# Patient Record
Sex: FEMALE | Race: WHITE | NOT HISPANIC OR LATINO | URBAN - NONMETROPOLITAN AREA
[De-identification: names, ages, dates, MRNs, and addresses within clinical notes are randomized per-mention and may not be internally consistent; named-entity substitution may affect disease eponyms.]

---

## 2019-04-05 ENCOUNTER — IMPORTED ENCOUNTER (OUTPATIENT)
Dept: URBAN - NONMETROPOLITAN AREA CLINIC 1 | Facility: CLINIC | Age: 64
End: 2019-04-05

## 2019-04-05 PROBLEM — H25.813: Noted: 2019-04-05

## 2019-04-05 PROBLEM — H43.812: Noted: 2019-04-05

## 2019-04-05 PROCEDURE — 99203 OFFICE O/P NEW LOW 30 MIN: CPT

## 2019-04-05 NOTE — PATIENT DISCUSSION
Flashes and Floaters-Discussed  signs/symptoms of RD or tear.-Discussed in detail the nature of flashes/floaters and to call if there is a sudden increase in their number. Cataract OU-Not yet surgical. -Reviewed symptoms of advancing cataract growth such as glare and halos and decreased vision.-Continue to monitor for now. Pt will notify us if any new symptoms develop. Will get records from [facility] and decide if she needs to be referred after [facility] looks at them.

## 2022-04-09 ASSESSMENT — TONOMETRY
OS_IOP_MMHG: 17
OD_IOP_MMHG: 15

## 2022-04-09 ASSESSMENT — VISUAL ACUITY
OS_CC: 20/40+2
OD_CC: 20/30